# Patient Record
(demographics unavailable — no encounter records)

---

## 2025-06-20 NOTE — PLAN
[FreeTextEntry1] : 48 y/o p3 with normal exam stable refill cryselle no absolute contra for mammo/sono screeining for colonospcy bse/calcium f/u for annual

## 2025-06-20 NOTE — PHYSICAL EXAM
[MA] : MA [FreeTextEntry2] : Guillermo [Appropriately responsive] : appropriately responsive [Alert] : alert [No Acute Distress] : no acute distress [No Lymphadenopathy] : no lymphadenopathy [Soft] : soft [Non-tender] : non-tender [Non-distended] : non-distended [No HSM] : No HSM [No Lesions] : no lesions [No Mass] : no mass [Oriented x3] : oriented x3 [Examination Of The Breasts] : a normal appearance [No Masses] : no breast masses were palpable [Labia Majora] : normal [Labia Minora] : normal [Uterine Adnexae] : normal [Normal rectal exam] : was normal [Normal Brown Stool] : was normal and brown [Internal Hemorrhoid] : no internal hemorrhoids were present [External Hemorrhoid] : no external hemorrhoids were present [Skin Tags] : no residual hemorrhoidal skin tags [Normal] : was normal [None] : there was no rectal mass

## 2025-06-20 NOTE — HISTORY OF PRESENT ILLNESS
[FreeTextEntry1] : 46 y/o  , LMP 6/12/25 here for [facility].   no abn bleeidng, pain or discharge.  Mammo 2023 normal.  hsa not done colonosocpy.        Ob/Gyn Hx -Nsd x 2, then Primary c/s breech , IUGR SGA , Sab x 2 Last Pap was 2021  hypothyroid - synthroid 100 mcg  metfromin  non smoker